# Patient Record
Sex: MALE | Race: WHITE | NOT HISPANIC OR LATINO | Employment: OTHER | URBAN - METROPOLITAN AREA
[De-identification: names, ages, dates, MRNs, and addresses within clinical notes are randomized per-mention and may not be internally consistent; named-entity substitution may affect disease eponyms.]

---

## 2017-07-20 NOTE — PATIENT DISCUSSION
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 Longstanding, no changes reported. No holes or tears noted. - Plan : Monitor for changes. Advised patient to call our office with any decreased vision or any increase in flashes and/or floaters.

## 2017-07-20 NOTE — PATIENT DISCUSSION
(H25.13) Age-related nuclear cataract, bilateral - Assesment : Examination revealed cataract. Mild symptoms. - Plan : Monitor for changes. Advised patient to call our office with decreased vision or increased symptoms. Updated GLRx given today. RTC in 1 year for Exam, sooner if problems or changes occur.

## 2018-08-23 NOTE — PATIENT DISCUSSION
(H25.13) Age-related nuclear cataract, bilateral - Assesment : Examination revealed cataract. - Plan : Monitor for changes. Advised patient of condition of cataracts and discussed symptoms of cataracts worsening and becoming more bothersome. Updated GLRx given today. Pt to call if vision changes or becomes more bothered by visual symptoms before next appt. RTC in 1 year for Exam, sooner if problems or changes occur.

## 2018-08-23 NOTE — PATIENT DISCUSSION
(Z07.730) Keratoconjunct sicca, not specified as Sjogren's, bilateral - Assesment : Examination revealed Dry Eye Syndrome - Plan : Monitor for changes. ATs prn.

## 2018-08-23 NOTE — PATIENT DISCUSSION
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 Longstanding. No new changes reported. No holes or tears noted today. - Plan : Monitor for changes. Advised patient to call our office with decreased vision or an increase in flashes and/or floaters.

## 2022-05-03 ENCOUNTER — COMPREHENSIVE EXAM (OUTPATIENT)
Dept: URBAN - METROPOLITAN AREA CLINIC 36 | Facility: CLINIC | Age: 87
End: 2022-05-03

## 2022-05-03 DIAGNOSIS — H35.3232: ICD-10-CM

## 2022-05-03 DIAGNOSIS — Z96.1: ICD-10-CM

## 2022-05-03 DIAGNOSIS — H52.7: ICD-10-CM

## 2022-05-03 PROCEDURE — 92014 COMPRE OPH EXAM EST PT 1/>: CPT

## 2022-05-03 PROCEDURE — 92015 DETERMINE REFRACTIVE STATE: CPT

## 2022-05-03 PROCEDURE — 92134 CPTRZ OPH DX IMG PST SGM RTA: CPT

## 2022-05-03 ASSESSMENT — TONOMETRY
OS_IOP_MMHG: 13
OD_IOP_MMHG: 13

## 2022-05-03 ASSESSMENT — VISUAL ACUITY
OD_CC: J12
OS_CC: 20/50
OD_SC: 20/400
OU_SC: 20/40
OS_SC: 20/40-1
OU_CC: J4
OS_SC: J12
OS_CC: J4
OD_SC: J12
OU_CC: 20/40-1
OD_CC: 20/400
OU_SC: J12

## 2022-12-13 ENCOUNTER — CONTACT LENSES/GLASSES VISIT (OUTPATIENT)
Dept: URBAN - METROPOLITAN AREA CLINIC 36 | Facility: CLINIC | Age: 87
End: 2022-12-13

## 2022-12-13 PROCEDURE — 92015GRNC REFRACTION GLASSES RECHECK - NO CHARGE

## 2022-12-13 ASSESSMENT — VISUAL ACUITY
OS_CC: 20/40-1
OU_CC: 20/40-1
OD_CC: CF 2FT
OU_CC: J2
OD_CC: <J12
OS_CC: J2

## 2023-04-25 ENCOUNTER — CONTACT LENSES/GLASSES VISIT (OUTPATIENT)
Dept: URBAN - METROPOLITAN AREA CLINIC 36 | Facility: CLINIC | Age: 88
End: 2023-04-25

## 2023-04-25 DIAGNOSIS — H52.7: ICD-10-CM

## 2023-04-25 PROCEDURE — 92015 DETERMINE REFRACTIVE STATE: CPT

## 2023-04-25 ASSESSMENT — VISUAL ACUITY
OS_CC: J8
OS_CC: 20/50+2
OD_CC: >J10
OD_CC: CF 2FT
OS_PH: 20/30-2

## 2023-05-18 ENCOUNTER — COMPREHENSIVE EXAM (OUTPATIENT)
Dept: URBAN - METROPOLITAN AREA CLINIC 36 | Facility: CLINIC | Age: 88
End: 2023-05-18

## 2023-05-18 DIAGNOSIS — Z96.1: ICD-10-CM

## 2023-05-18 DIAGNOSIS — H43.813: ICD-10-CM

## 2023-05-18 DIAGNOSIS — H35.3232: ICD-10-CM

## 2023-05-18 PROCEDURE — 92134 CPTRZ OPH DX IMG PST SGM RTA: CPT

## 2023-05-18 PROCEDURE — 92014 COMPRE OPH EXAM EST PT 1/>: CPT

## 2023-05-18 ASSESSMENT — VISUAL ACUITY
OS_CC: 20/40-2
OS_SC: 20/40-2
OS_SC: J10-1
OS_CC: J3
OD_CC: 20/400+1
OD_SC: CF 5FT
OD_CC: J>10

## 2023-05-18 ASSESSMENT — TONOMETRY
OD_IOP_MMHG: 12
OS_IOP_MMHG: 13